# Patient Record
Sex: MALE | Race: OTHER | NOT HISPANIC OR LATINO | URBAN - METROPOLITAN AREA
[De-identification: names, ages, dates, MRNs, and addresses within clinical notes are randomized per-mention and may not be internally consistent; named-entity substitution may affect disease eponyms.]

---

## 2017-07-12 ENCOUNTER — EMERGENCY (EMERGENCY)
Facility: HOSPITAL | Age: 52
LOS: 1 days | Discharge: HOME | End: 2017-07-12
Admitting: EMERGENCY MEDICINE
Payer: MEDICARE

## 2017-07-12 VITALS
HEART RATE: 83 BPM | OXYGEN SATURATION: 97 % | SYSTOLIC BLOOD PRESSURE: 110 MMHG | DIASTOLIC BLOOD PRESSURE: 71 MMHG | RESPIRATION RATE: 16 BRPM | TEMPERATURE: 98 F

## 2017-07-12 DIAGNOSIS — F63.9 IMPULSE DISORDER, UNSPECIFIED: ICD-10-CM

## 2017-07-12 DIAGNOSIS — F79 UNSPECIFIED INTELLECTUAL DISABILITIES: ICD-10-CM

## 2017-07-12 DIAGNOSIS — F43.24 ADJUSTMENT DISORDER WITH DISTURBANCE OF CONDUCT: ICD-10-CM

## 2017-07-12 PROCEDURE — 90792 PSYCH DIAG EVAL W/MED SRVCS: CPT | Mod: GC

## 2017-07-12 PROCEDURE — 99284 EMERGENCY DEPT VISIT MOD MDM: CPT

## 2017-07-12 NOTE — ED BEHAVIORAL HEALTH ASSESSMENT NOTE - SUMMARY
Patient is a 52 y/o M, domiciled at the [facility], single, noncaregiver, disabled, with PPhx of depression, intellectual disability, and impulse control disorder, hx of remote inpt hospitalizations (last at [facility] in  after pt's father passed away), no hx of suicide attempts, no hx of violence or arrests, no hx of substance abuse. Patient presents brought in by EMS after becoming upset following recent breakup with a GF (peer) at the residence. Patient endorses "yelling and screaming" but denies episodes of physically aggressive behavior. He denies SI/HI/I/P. He reports chronic AH of his  father's voice in times of stress but denies this to be bothersome. He denies all other psychotic and mood symptoms. At this time pt is at low imminent risk of harm and does not require inpt hospitalization.

## 2017-07-12 NOTE — ED PROVIDER NOTE - OBJECTIVE STATEMENT
This is a 51 year old Male BIB from group home for [facility] eval for suicidal ideations. Patient reports he wanted to stab himself in the chest and endorses + AH. Patient reports he no longer wants to stab himself but the voices are bothering him. Denies SI at this time. Denies chest pain, SOB, N/V/D and fevers, Denies palpitations or diaphoresis. Denies Numbness, Tingling, Blurry Vision and HA.  Denies suicidal/homicidal thoughts. Denies visual/auditory hallucinations. Denies ETOH/Illicit drug use. Denies recent falls, trauma and injuries. Denies pain or any other medical complaints. This is a 51 year old Male PMHX Depression Intellectual disability  impulse control disorder BIB from group home for [facility] for suicidal ideations. Patient reports he wanted to stab himself in the chest and endorses + AH. Patient reports he no longer wants to stab himself but the voices are bothering him. Denies SI at this time. Denies chest pain, SOB, N/V/D and fevers, Denies palpitations or diaphoresis. Denies Numbness, Tingling, Blurry Vision and HA.  Denies suicidal/homicidal thoughts. Denies visual/auditory hallucinations. Denies ETOH/Illicit drug use. Denies recent falls, trauma and injuries. Denies pain or any other medical complaints.

## 2017-07-12 NOTE — ED BEHAVIORAL HEALTH NOTE - BEHAVIORAL HEALTH NOTE
Writer called Lisa Cabrera, [facility], [phone], the behavioral interventional specialist, at the patient’s residential facility, [facility], [phone], as well as Muna Lees, the housing supervisor, for collateral information. She reported the following:    Neither informant was aware of whether the patient has had IP psychiatric care in the past. He is in treatment with Dr. Siddiqi at [facility], [phone]. Today Ms. Cabrera was working with him and he expressed suicidal ideation, stating he was hearing CAH from the voice of his  father, so he was sent to the [facility] ED.     The patient is diagnosed with Depression, Impulse Control Disorder, and a mild intellectual disability. The patient had been doing well, eating, sleeping and tending to hygiene, with staff encouragement as usual, with no trouble.  He takes his medications as prescribed; there have been no recent medication changes. Today a female peer stated she did not like the fact that he hugged her. This upset him, and he came to Ms. Cabrera and stated he broke up with a girlfriend and was upset about it. He stated he was hearing the voice of his  father instructing him to stab himself in the chest to kill himself and come with him. He said his father’s voice said that was the only way to get rid of the pain. He has a remote history of self-injurious behavior, details unknown, but has not engaged in self-injurious behavior since the informants know him. He did not report any delusions. He has not been aggressive toward others or property, nor expressed thoughts of such behavior. He has no history of substance abuse.     The patient’s current medications are:   Seroquel 200 mg qam, 400 mg q5pm  Li Carbonate 600 mg bid  Mirtazipine 15mg hs  Ca 600 mg and Vit D bid  Multivitamin 1 qam  Vit D3 1000 IU qam  Vit C 250 mg qam  Fe Sulfate 325 mg qam  Lactose enzyme 3000 IU qd  Refresh lactulube to lower lip hs  Chotrimazole 1% soln 30 mg to affected area (feet) bid  Brimonidine 0.2% OP drop 5 mg, 1 drop each eye bid  Restasis .05% 0.4 mg X3 1 drop each eye bid  Cosopt pf soln 60’s each eye bid  Refresh Optive Advp/s U/D 1 drop each eye qid  Ammonium lactulose 12% cream 385 to feet hs  Latanoporst .005% op drop 1 drop each eye hs    Writer informed Ms. Lees that the patient was being discharged, and she stated a staff member would come and pick him up.

## 2017-07-12 NOTE — ED PROVIDER NOTE - PMH
<<----- Click to add NO pertinent Past Medical History No pertinent past medical history Depression    Impulse control disease    Intellectual disability

## 2017-07-12 NOTE — ED BEHAVIORAL HEALTH ASSESSMENT NOTE - CASE SUMMARY
50 y/o M, domiciled at the [facility], single, noncaregiver, disabled, with PPhx of depression, intellectual disability, and impulse control disorder, hx of remote inpt hospitalizations (last at [facility] in 1999 after pt's father passed away), no hx of suicide attempts, no hx of violence or arrests, no hx of substance abuse. Patient presents brought in by EMS after becoming upset following recent breakup with a GF (peer) at the residence.    Patient calm, pleasant, joking and smiling. Reports he was upset earlier over a break up but now feels better. Denies SI/HI/I/P as well as gerard and psychosis. No history of SIB or suicide attempts. Has been compliant with medications and at his baseline otherwise. Denies other psychiatric complaints. Does not meet criteria for inpatient admission and will be discharged home with above plan.

## 2017-07-12 NOTE — ED BEHAVIORAL HEALTH ASSESSMENT NOTE - OTHER PAST PSYCHIATRIC HISTORY (INCLUDE DETAILS REGARDING ONSET, COURSE OF ILLNESS, INPATIENT/OUTPATIENT TREATMENT)
PPhx of depression, intellectual disability, and impulse control disorder, hx of remote inpt hospitalizations (last at [facility] in 1999 after pt's father passed away), no hx of suicide attempts, no hx of violence or arrests, no hx of substance abuse.

## 2017-07-12 NOTE — ED PROVIDER NOTE - CARE PLAN
Principal Discharge DX:	Schizoaffective disorder Principal Discharge DX:	Intellectual disability  Secondary Diagnosis:	Impulse control disease

## 2017-07-12 NOTE — ED ADULT NURSE NOTE - OBJECTIVE STATEMENT
pt is a 51 year old male biba from group home c/o SI , told staff he wanted to  stab himself. pt seen in  intake area, pt states he does not want to hurt himself, does not want to hurt anyone else. pt is calm  and cooperative at this time.

## 2017-07-12 NOTE — ED BEHAVIORAL HEALTH ASSESSMENT NOTE - RISK ASSESSMENT
Risk factors include acute stressor (break up with GF) and impulsivity. Protective factors include future orientation, no hx of suicide attempts, future orientation, compliant with outpatient treatment and meds, able to engage in safety planning, and intact social and residential support. At this time pt is at low imminent risk of harm and does not require inpt hospitalization.

## 2017-07-12 NOTE — ED BEHAVIORAL HEALTH ASSESSMENT NOTE - SUICIDE PROTECTIVE FACTORS
Positive therapeutic relationships/Future oriented/Identifies reasons for living/Supportive social network or family

## 2017-07-12 NOTE — ED BEHAVIORAL HEALTH ASSESSMENT NOTE - CURRENT MEDICATION
seroquel 200mg qAM and 400mg QHS, Lithium 600mg BID, mirtazapine 15mg QHS, MVI, calcium supplements, vit D, iron, cholrimazole to feet BID, brimonidine and restasis 1 eyedrop BID, latanoprost to eyes QHS

## 2017-07-12 NOTE — ED BEHAVIORAL HEALTH ASSESSMENT NOTE - HPI (INCLUDE ILLNESS QUALITY, SEVERITY, DURATION, TIMING, CONTEXT, MODIFYING FACTORS, ASSOCIATED SIGNS AND SYMPTOMS)
Patient is a 50 y/o M, domiciled at the [facility], single, noncaregiver, disabled, with PPhx of depression, intellectual disability, and impulse control disorder, hx of remote inpt hospitalizations (last at [facility] in  after pt's father passed away), no hx of suicide attempts, no hx of violence or arrests, no hx of substance abuse. Patient presents brought in by EMS after becoming upset following recent breakup with a GF (peer) at the residence.    Patient is calm, cooperative, and pleasant on interview. He states "I'm fine, I'm calm now". He states "I blew up because I couldn't find a GF, but I found someone new I think". Patient states that he was "yelling and screaming" earlier today. He denies engaging in physically aggressive behaviors towards people or property. He reports good mood, sleep (8 hrs per night), good appetite, and adequate energy level. He denies SI/HI/I/P, urges for SIB, or aggressive I/I/P. He denies all manic symptoms including decreased need for sleep or elevated energy level. He denies paranoid ideation. He reports intermittent AH of his  father's voice that have been chronic since his death in . He states he heard the voices earlier today as the voices are more prominent during times of stress. He denies having CAH. He denies other perceptual disturbances. He denies substance use.    See  note for collateral information. Patient is a 50 y/o M, domiciled at the [facility], single, noncaregiver, disabled, with PPhx of depression, intellectual disability, and impulse control disorder, hx of remote inpt hospitalizations (last at [facility] in  after pt's father passed away), no hx of suicide attempts, no hx of violence or arrests, no hx of substance abuse. Patient presents brought in by EMS after becoming upset following recent breakup with a GF (peer) at the residence.    Patient is calm, cooperative, and pleasant on interview. He states "I'm fine, I'm calm now". He states "I blew up because I couldn't find a GF, but I found someone new I think". Patient states that he was "yelling and screaming" earlier today. He denies engaging in physically aggressive behaviors towards people or property. He reports good mood, sleep (8 hrs per night), good appetite, and adequate energy level. He denies SI/HI/I/P, urges for SIB, or aggressive I/I/P. He denies all manic symptoms including decreased need for sleep or elevated energy level. He denies paranoid ideation. He reports intermittent AH of his  father's voice that have been chronic since his death in . He states he heard the voices earlier today as the voices are more prominent during times of stress. He denies having CAH. He denies other perceptual disturbances. He denies substance use. He reports getting along well with peers and staff. He states that he enjoys activities including going to baseball games, dancing, and spending time with friends.    See  note for collateral information. Patient is a 50 y/o M, domiciled at the [facility] single, noncaregiver, disabled, with PPhx of depression, intellectual disability, and impulse control disorder, hx of remote inpt hospitalizations (last at [facility] in  after pt's father passed away), no hx of suicide attempts, no hx of violence or arrests, no hx of substance abuse. Patient presents brought in by EMS after becoming upset following recent breakup with a GF (peer) at the residence.    Patient is calm, cooperative, and pleasant on interview. He states "I'm fine, I'm calm now". He states "I blew up because I couldn't find a GF, but I found someone new I think". Patient states that he was "yelling and screaming" earlier today but does not recall what he was yelling. He denies engaging in physically aggressive behaviors towards people or property. He reports good mood, sleep (8 hrs per night), good appetite, and adequate energy level. He denies SI/HI/I/P, urges for SIB, or aggressive I/I/P. He denies all manic symptoms including decreased need for sleep or elevated energy level. He denies paranoid ideation. He reports intermittent AH of his  father's voice that have been chronic since his death in . He states he heard the voices earlier today as the voices are more prominent during times of stress. He denies having CAH. He denies other perceptual disturbances. He denies substance use. He reports getting along well with peers and staff. He states that he enjoys activities including going to baseball games, dancing, and spending time with friends.    See  note for collateral information.

## 2019-05-23 ENCOUNTER — TRANSCRIPTION ENCOUNTER (OUTPATIENT)
Age: 54
End: 2019-05-23

## 2022-12-01 PROBLEM — Z00.00 ENCOUNTER FOR PREVENTIVE HEALTH EXAMINATION: Status: ACTIVE | Noted: 2022-12-01

## 2022-12-20 PROBLEM — F63.9 IMPULSE DISORDER, UNSPECIFIED: Chronic | Status: ACTIVE | Noted: 2017-07-12

## 2022-12-20 PROBLEM — F32.9 MAJOR DEPRESSIVE DISORDER, SINGLE EPISODE, UNSPECIFIED: Chronic | Status: ACTIVE | Noted: 2017-07-12

## 2022-12-20 PROBLEM — F79 UNSPECIFIED INTELLECTUAL DISABILITIES: Chronic | Status: ACTIVE | Noted: 2017-07-12

## 2023-01-09 ENCOUNTER — APPOINTMENT (OUTPATIENT)
Dept: SURGERY | Facility: CLINIC | Age: 58
End: 2023-01-09
Payer: MEDICARE

## 2023-01-09 VITALS
DIASTOLIC BLOOD PRESSURE: 88 MMHG | TEMPERATURE: 98.5 F | HEART RATE: 80 BPM | HEIGHT: 67 IN | SYSTOLIC BLOOD PRESSURE: 134 MMHG

## 2023-01-09 PROCEDURE — 99203 OFFICE O/P NEW LOW 30 MIN: CPT

## 2023-02-02 ENCOUNTER — OUTPATIENT (OUTPATIENT)
Dept: OUTPATIENT SERVICES | Facility: HOSPITAL | Age: 58
LOS: 1 days | Discharge: HOME | End: 2023-02-02
Payer: MEDICARE

## 2023-02-02 VITALS
SYSTOLIC BLOOD PRESSURE: 118 MMHG | HEIGHT: 67 IN | OXYGEN SATURATION: 98 % | TEMPERATURE: 98 F | DIASTOLIC BLOOD PRESSURE: 83 MMHG | WEIGHT: 114.86 LBS | HEART RATE: 96 BPM | RESPIRATION RATE: 18 BRPM

## 2023-02-02 DIAGNOSIS — K40.90 UNILATERAL INGUINAL HERNIA, WITHOUT OBSTRUCTION OR GANGRENE, NOT SPECIFIED AS RECURRENT: ICD-10-CM

## 2023-02-02 DIAGNOSIS — F79 UNSPECIFIED INTELLECTUAL DISABILITIES: ICD-10-CM

## 2023-02-02 DIAGNOSIS — Z01.818 ENCOUNTER FOR OTHER PREPROCEDURAL EXAMINATION: ICD-10-CM

## 2023-02-02 DIAGNOSIS — F32.9 MAJOR DEPRESSIVE DISORDER, SINGLE EPISODE, UNSPECIFIED: ICD-10-CM

## 2023-02-02 DIAGNOSIS — F31.10 BIPOLAR DISORDER, CURRENT EPISODE MANIC WITHOUT PSYCHOTIC FEATURES, UNSPECIFIED: ICD-10-CM

## 2023-02-02 DIAGNOSIS — Z90.49 ACQUIRED ABSENCE OF OTHER SPECIFIED PARTS OF DIGESTIVE TRACT: Chronic | ICD-10-CM

## 2023-02-02 LAB
ANION GAP SERPL CALC-SCNC: 5 MMOL/L — SIGNIFICANT CHANGE UP (ref 5–17)
BLD GP AB SCN SERPL QL: SIGNIFICANT CHANGE UP
BUN SERPL-MCNC: 13 MG/DL — SIGNIFICANT CHANGE UP (ref 7–23)
CALCIUM SERPL-MCNC: 9.4 MG/DL — SIGNIFICANT CHANGE UP (ref 8.5–10.1)
CHLORIDE SERPL-SCNC: 111 MMOL/L — HIGH (ref 96–108)
CO2 SERPL-SCNC: 27 MMOL/L — SIGNIFICANT CHANGE UP (ref 22–31)
CREAT SERPL-MCNC: 1.12 MG/DL — SIGNIFICANT CHANGE UP (ref 0.5–1.3)
EGFR: 77 ML/MIN/1.73M2 — SIGNIFICANT CHANGE UP
GLUCOSE SERPL-MCNC: 105 MG/DL — HIGH (ref 70–99)
HCG SERPL-ACNC: <1 MIU/ML — SIGNIFICANT CHANGE UP
HCT VFR BLD CALC: 41.1 % — SIGNIFICANT CHANGE UP (ref 39–50)
HGB BLD-MCNC: 12.6 G/DL — LOW (ref 13–17)
MCHC RBC-ENTMCNC: 26.1 PG — LOW (ref 27–34)
MCHC RBC-ENTMCNC: 30.7 G/DL — LOW (ref 32–36)
MCV RBC AUTO: 85.3 FL — SIGNIFICANT CHANGE UP (ref 80–100)
NRBC # BLD: 0 /100 WBCS — SIGNIFICANT CHANGE UP (ref 0–0)
PLATELET # BLD AUTO: 293 K/UL — SIGNIFICANT CHANGE UP (ref 150–400)
POTASSIUM SERPL-MCNC: 4.5 MMOL/L — SIGNIFICANT CHANGE UP (ref 3.5–5.3)
POTASSIUM SERPL-SCNC: 4.5 MMOL/L — SIGNIFICANT CHANGE UP (ref 3.5–5.3)
RBC # BLD: 4.82 M/UL — SIGNIFICANT CHANGE UP (ref 4.2–5.8)
RBC # FLD: 14.8 % — HIGH (ref 10.3–14.5)
SODIUM SERPL-SCNC: 143 MMOL/L — SIGNIFICANT CHANGE UP (ref 135–145)
WBC # BLD: 5.29 K/UL — SIGNIFICANT CHANGE UP (ref 3.8–10.5)
WBC # FLD AUTO: 5.29 K/UL — SIGNIFICANT CHANGE UP (ref 3.8–10.5)

## 2023-02-02 PROCEDURE — 93010 ELECTROCARDIOGRAM REPORT: CPT

## 2023-02-02 NOTE — H&P PST ADULT - NSICDXPASTMEDICALHX_GEN_ALL_CORE_FT
PAST MEDICAL HISTORY:  Depression     Impulse control disease     Intellectual disability      PAST MEDICAL HISTORY:  Bipolar disorder, manic     Depression     Glaucoma     Impulse control disease     Intellectual disability     Legally blind

## 2023-02-02 NOTE — H&P PST ADULT - PROBLEM SELECTOR PLAN 1
Labs-CBC, BMP ,T&S EKG   M/C required    Preop Hibiclens x 1 day instructions reviewed and given.   Take routine meds DOS with small sips of water, avoid NSAIDs and OTC supplements  Pt aware COVID-19 PCR test needed 3-5 days prior to surgery   Anesthesiologist to review PST labs, EKG, required clearances, and optimization for surgery

## 2023-02-02 NOTE — H&P PST ADULT - HISTORY OF PRESENT ILLNESS
56 y/o M PMH  56 y/o M PMH glaucoma, mild intellectual disability presents to PST for left inguinal hernia repair open on 2/14/23 with     This patient denies any fever, cough, sob, flu like symptoms or travel outside of the [place] in the past 30 days      58 y/o M PMH glaucoma, mild intellectual disability, bipolar on lithium presents to PST for left inguinal hernia repair open on 2/14/23 with     This patient denies any fever, cough, sob, flu like symptoms or travel outside of the [place] in the past 30 days

## 2023-02-02 NOTE — H&P PST ADULT - ASSESSMENT
56 y/o M PMH glaucoma, mild intellectual disability presents to PST for left inguinal hernia repair open on 23 with Dr.Held CAPRINI SCORE [CLOT]    AGE RELATED RISK FACTORS                                                       MOBILITY RELATED FACTORS  [x ] Age 41-60 years                                            (1 Point)                  [ ] Bed rest                                                        (1 Point)  [ ] Age: 61-74 years                                           (2 Points)                 [ ] Plaster cast                                                   (2 Points)  [ ] Age= 75 years                                              (3 Points)                 [ ] Bed bound for more than 72 hours                 (2 Points)    DISEASE RELATED RISK FACTORS                                               GENDER SPECIFIC FACTORS  [ ] Edema in the lower extremities                       (1 Point)                  [ ] Pregnancy                                                     (1 Point)  [ ] Varicose veins                                               (1 Point)                  [ ] Post-partum < 6 weeks                                   (1 Point)             [ ] BMI > 25 Kg/m2                                            (1 Point)                  [ ] Hormonal therapy  or oral contraception          (1 Point)                 [ ] Sepsis (in the previous month)                        (1 Point)                  [ ] History of pregnancy complications                 (1 point)  [ ] Pneumonia or serious lung disease                                               [ ] Unexplained or recurrent                     (1 Point)           (in the previous month)                               (1 Point)  [ ] Abnormal pulmonary function test                     (1 Point)                 SURGERY RELATED RISK FACTORS  [ ] Acute myocardial infarction                              (1 Point)                 [ ]  Section                                             (1 Point)  [ ] Congestive heart failure (in the previous month)  (1 Point)               [ ] Minor surgery                                                  (1 Point)   [ ] Inflammatory bowel disease                             (1 Point)                 [ ] Arthroscopic surgery                                        (2 Points)  [ ] Central venous access                                      (2 Points)                [x ] General surgery lasting more than 45 minutes   (2 Points)       [ ] Stroke (in the previous month)                          (5 Points)               [ ] Elective arthroplasty                                         (5 Points)                                                                                                                                               HEMATOLOGY RELATED FACTORS                                                 TRAUMA RELATED RISK FACTORS  [ ] Prior episodes of VTE                                     (3 Points)                [ ] Fracture of the hip, pelvis, or leg                       (5 Points)  [ ] Positive family history for VTE                         (3 Points)                 [ ] Acute spinal cord injury (in the previous month)  (5 Points)  [ ] Prothrombin 08017 A                                     (3 Points)                 [ ] Paralysis  (less than 1 month)                             (5 Points)  [ ] Factor V Leiden                                             (3 Points)                  [ ] Multiple Trauma within 1 month                        (5 Points)  [ ] Lupus anticoagulants                                     (3 Points)                                                           [ ] Anticardiolipin antibodies                               (3 Points)                                                       [ ] High homocysteine in the blood                      (3 Points)                                             [ ] Other congenital or acquired thrombophilia      (3 Points)                                                [ ] Heparin induced thrombocytopenia                  (3 Points)                                          Total Score [    3  ]    Matthewrini Score 0 - 2:  Low Risk, No VTE Prophylaxis required for most patients, encourage ambulation  Caprini Score 3 - 6:  At Risk, pharmacologic VTE prophylaxis is indicated for most patients (in the absence of a contraindication)  Caprini Score Greater than or = 7:  High Risk, pharmacologic VTE prophylaxis is indicated for most patients (in the absence of a contraindication)

## 2023-02-13 ENCOUNTER — TRANSCRIPTION ENCOUNTER (OUTPATIENT)
Age: 58
End: 2023-02-13

## 2023-02-14 ENCOUNTER — APPOINTMENT (OUTPATIENT)
Dept: SURGERY | Facility: HOSPITAL | Age: 58
End: 2023-02-14

## 2023-02-14 ENCOUNTER — TRANSCRIPTION ENCOUNTER (OUTPATIENT)
Age: 58
End: 2023-02-14

## 2023-02-14 ENCOUNTER — OUTPATIENT (OUTPATIENT)
Dept: OUTPATIENT SERVICES | Facility: HOSPITAL | Age: 58
LOS: 1 days | Discharge: HOME | End: 2023-02-14
Payer: COMMERCIAL

## 2023-02-14 VITALS
HEART RATE: 103 BPM | OXYGEN SATURATION: 99 % | SYSTOLIC BLOOD PRESSURE: 138 MMHG | RESPIRATION RATE: 18 BRPM | WEIGHT: 114.86 LBS | TEMPERATURE: 98 F | DIASTOLIC BLOOD PRESSURE: 97 MMHG | HEIGHT: 67 IN

## 2023-02-14 VITALS
RESPIRATION RATE: 17 BRPM | OXYGEN SATURATION: 100 % | DIASTOLIC BLOOD PRESSURE: 94 MMHG | HEART RATE: 87 BPM | SYSTOLIC BLOOD PRESSURE: 137 MMHG

## 2023-02-14 DIAGNOSIS — Z90.49 ACQUIRED ABSENCE OF OTHER SPECIFIED PARTS OF DIGESTIVE TRACT: Chronic | ICD-10-CM

## 2023-02-14 PROCEDURE — 49505 PRP I/HERN INIT REDUC >5 YR: CPT

## 2023-02-14 PROCEDURE — 49505 PRP I/HERN INIT REDUC >5 YR: CPT | Mod: AS

## 2023-02-14 DEVICE — MESH HERNIA PARIETEX PROGRIP 14 X 9CM LEFT: Type: IMPLANTABLE DEVICE | Site: LEFT | Status: FUNCTIONAL

## 2023-02-14 RX ORDER — FENTANYL CITRATE 50 UG/ML
25 INJECTION INTRAVENOUS
Refills: 0 | Status: DISCONTINUED | OUTPATIENT
Start: 2023-02-14 | End: 2023-02-15

## 2023-02-14 RX ORDER — SODIUM CHLORIDE 9 MG/ML
3 INJECTION INTRAMUSCULAR; INTRAVENOUS; SUBCUTANEOUS EVERY 8 HOURS
Refills: 0 | Status: DISCONTINUED | OUTPATIENT
Start: 2023-02-14 | End: 2023-02-14

## 2023-02-14 RX ORDER — SODIUM CHLORIDE 9 MG/ML
1000 INJECTION, SOLUTION INTRAVENOUS
Refills: 0 | Status: DISCONTINUED | OUTPATIENT
Start: 2023-02-14 | End: 2023-02-15

## 2023-02-14 RX ORDER — FENTANYL CITRATE 50 UG/ML
50 INJECTION INTRAVENOUS
Refills: 0 | Status: DISCONTINUED | OUTPATIENT
Start: 2023-02-14 | End: 2023-02-15

## 2023-02-14 RX ORDER — OXYCODONE HYDROCHLORIDE 5 MG/1
1 TABLET ORAL
Qty: 10 | Refills: 0
Start: 2023-02-14

## 2023-02-14 RX ORDER — ONDANSETRON 8 MG/1
4 TABLET, FILM COATED ORAL ONCE
Refills: 0 | Status: DISCONTINUED | OUTPATIENT
Start: 2023-02-14 | End: 2023-02-15

## 2023-02-14 RX ADMIN — SODIUM CHLORIDE 75 MILLILITER(S): 9 INJECTION, SOLUTION INTRAVENOUS at 10:20

## 2023-02-14 RX ADMIN — FENTANYL CITRATE 25 MICROGRAM(S): 50 INJECTION INTRAVENOUS at 11:24

## 2023-02-14 RX ADMIN — FENTANYL CITRATE 25 MICROGRAM(S): 50 INJECTION INTRAVENOUS at 10:54

## 2023-02-14 RX ADMIN — SODIUM CHLORIDE 3 MILLILITER(S): 9 INJECTION INTRAMUSCULAR; INTRAVENOUS; SUBCUTANEOUS at 07:56

## 2023-02-14 NOTE — ASU DISCHARGE PLAN (ADULT/PEDIATRIC) - NURSING INSTRUCTIONS
discharged instructions and teachings done. Patient and sister verbalizes understanding of teachings. Sister will f/u with md as plan.

## 2023-02-14 NOTE — ASU DISCHARGE PLAN (ADULT/PEDIATRIC) - NS MD DC FALL RISK RISK
For information on Fall & Injury Prevention, visit: [email or web address] OR  [email or web address] OR  https://www.cdc.gov/steadi/patient.html

## 2023-02-14 NOTE — ASU DISCHARGE PLAN (ADULT/PEDIATRIC) - ASU DC SPECIAL INSTRUCTIONSFT
ice packs on-off for 24hrs    f/u 1 week with Dr. Gray      take 1000mg tylenol + 400,g motrin or advil every 6hrs as needed for pain. may add 1 oxycodone if needed for severe pain

## 2023-02-14 NOTE — ASU PATIENT PROFILE, ADULT - NSICDXPASTMEDICALHX_GEN_ALL_CORE_FT
PAST MEDICAL HISTORY:  Bipolar disorder, manic     Depression     Glaucoma     Impulse control disease     Intellectual disability     Legally blind

## 2023-02-16 DIAGNOSIS — Z79.899 OTHER LONG TERM (CURRENT) DRUG THERAPY: ICD-10-CM

## 2023-02-16 DIAGNOSIS — K40.90 UNILATERAL INGUINAL HERNIA, WITHOUT OBSTRUCTION OR GANGRENE, NOT SPECIFIED AS RECURRENT: ICD-10-CM

## 2023-09-20 PROBLEM — H54.8 LEGAL BLINDNESS, AS DEFINED IN USA: Chronic | Status: ACTIVE | Noted: 2023-02-02

## 2023-09-20 PROBLEM — H40.9 UNSPECIFIED GLAUCOMA: Chronic | Status: ACTIVE | Noted: 2023-02-02

## 2023-09-20 PROBLEM — F31.10 BIPOLAR DISORDER, CURRENT EPISODE MANIC WITHOUT PSYCHOTIC FEATURES, UNSPECIFIED: Chronic | Status: ACTIVE | Noted: 2023-02-02

## 2023-09-22 ENCOUNTER — OUTPATIENT (OUTPATIENT)
Dept: OUTPATIENT SERVICES | Facility: HOSPITAL | Age: 58
LOS: 1 days | End: 2023-09-22
Payer: MEDICARE

## 2023-09-22 VITALS
WEIGHT: 111.99 LBS | HEIGHT: 66 IN | TEMPERATURE: 98 F | SYSTOLIC BLOOD PRESSURE: 120 MMHG | DIASTOLIC BLOOD PRESSURE: 76 MMHG | OXYGEN SATURATION: 97 % | RESPIRATION RATE: 18 BRPM | HEART RATE: 76 BPM

## 2023-09-22 DIAGNOSIS — Z98.890 OTHER SPECIFIED POSTPROCEDURAL STATES: Chronic | ICD-10-CM

## 2023-09-22 DIAGNOSIS — Z90.49 ACQUIRED ABSENCE OF OTHER SPECIFIED PARTS OF DIGESTIVE TRACT: Chronic | ICD-10-CM

## 2023-09-22 DIAGNOSIS — K05.6 PERIODONTAL DISEASE, UNSPECIFIED: ICD-10-CM

## 2023-09-22 DIAGNOSIS — K02.62 DENTAL CARIES ON SMOOTH SURFACE PENETRATING INTO DENTIN: ICD-10-CM

## 2023-09-22 DIAGNOSIS — Z01.818 ENCOUNTER FOR OTHER PREPROCEDURAL EXAMINATION: ICD-10-CM

## 2023-09-22 LAB
ANION GAP SERPL CALC-SCNC: 10 MMOL/L — SIGNIFICANT CHANGE UP (ref 5–17)
BUN SERPL-MCNC: 20 MG/DL — SIGNIFICANT CHANGE UP (ref 7–23)
CALCIUM SERPL-MCNC: 10.4 MG/DL — SIGNIFICANT CHANGE UP (ref 8.4–10.5)
CHLORIDE SERPL-SCNC: 105 MMOL/L — SIGNIFICANT CHANGE UP (ref 96–108)
CO2 SERPL-SCNC: 27 MMOL/L — SIGNIFICANT CHANGE UP (ref 22–31)
CREAT SERPL-MCNC: 1.15 MG/DL — SIGNIFICANT CHANGE UP (ref 0.5–1.3)
EGFR: 75 ML/MIN/1.73M2 — SIGNIFICANT CHANGE UP
GLUCOSE SERPL-MCNC: 108 MG/DL — HIGH (ref 70–99)
HCT VFR BLD CALC: 40.2 % — SIGNIFICANT CHANGE UP (ref 39–50)
HGB BLD-MCNC: 12.2 G/DL — LOW (ref 13–17)
MCHC RBC-ENTMCNC: 26.8 PG — LOW (ref 27–34)
MCHC RBC-ENTMCNC: 30.3 GM/DL — LOW (ref 32–36)
MCV RBC AUTO: 88.4 FL — SIGNIFICANT CHANGE UP (ref 80–100)
NRBC # BLD: 0 /100 WBCS — SIGNIFICANT CHANGE UP (ref 0–0)
PLATELET # BLD AUTO: 295 K/UL — SIGNIFICANT CHANGE UP (ref 150–400)
POTASSIUM SERPL-MCNC: 4.8 MMOL/L — SIGNIFICANT CHANGE UP (ref 3.5–5.3)
POTASSIUM SERPL-SCNC: 4.8 MMOL/L — SIGNIFICANT CHANGE UP (ref 3.5–5.3)
RBC # BLD: 4.55 M/UL — SIGNIFICANT CHANGE UP (ref 4.2–5.8)
RBC # FLD: 14.9 % — HIGH (ref 10.3–14.5)
SODIUM SERPL-SCNC: 142 MMOL/L — SIGNIFICANT CHANGE UP (ref 135–145)
WBC # BLD: 7.12 K/UL — SIGNIFICANT CHANGE UP (ref 3.8–10.5)
WBC # FLD AUTO: 7.12 K/UL — SIGNIFICANT CHANGE UP (ref 3.8–10.5)

## 2023-09-22 PROCEDURE — 36415 COLL VENOUS BLD VENIPUNCTURE: CPT

## 2023-09-22 PROCEDURE — G0463: CPT

## 2023-09-22 PROCEDURE — 85027 COMPLETE CBC AUTOMATED: CPT

## 2023-09-22 PROCEDURE — 80048 BASIC METABOLIC PNL TOTAL CA: CPT

## 2023-09-22 RX ORDER — POLYETHYLENE GLYCOL 3350 17 G/17G
0 POWDER, FOR SOLUTION ORAL
Qty: 0 | Refills: 0 | DISCHARGE

## 2023-09-22 RX ORDER — QUETIAPINE FUMARATE 200 MG/1
1 TABLET, FILM COATED ORAL
Qty: 0 | Refills: 0 | DISCHARGE

## 2023-09-22 RX ORDER — SODIUM CHLORIDE 9 MG/ML
3 INJECTION INTRAMUSCULAR; INTRAVENOUS; SUBCUTANEOUS EVERY 8 HOURS
Refills: 0 | Status: DISCONTINUED | OUTPATIENT
Start: 2023-10-13 | End: 2023-10-27

## 2023-09-22 NOTE — H&P PST ADULT - HISTORY OF PRESENT ILLNESS
58 y/o M PMH glaucoma, mild intellectual disability, bipolar on lithium s/p  left inguinal hernia repair on 2/14/23 . Presents to [facility] for scheduled Comprehensive Dental Treatment Under Anesthesia on 10/13/23.

## 2023-09-22 NOTE — H&P PST ADULT - PROBLEM SELECTOR PLAN 1
Comprehensive  Dental Treatment  Under Anesthesia on 10/13/23.  Labs-CBC, BMP   M/C required    Take routine meds DOS with small sips of water

## 2023-09-22 NOTE — H&P PST ADULT - ASSESSMENT
ACTIVITY-  lives in group home , walk around unit, participate activities   Energy Expenditure(Mets):    6.25 by dasi mets   Symptoms-none     Airway:  normal    Mallampati-  2   Dental: Patient denies loose teeth missing teeth

## 2023-09-22 NOTE — H&P PST ADULT - NSANTHGENDERRD_ENT_A_CORE
From: Crescencio Garcia  To: Lucinda Alcaraz MD  Sent: 5/15/2019 12:29 PM CDT  Subject: Other    With regards to my mother who is also your patient. Zeenat Garcia.  Pertaining to her latest PET scan results, would it be possible to call her and clarify the results of the test with her prior to her appt. with the Pulmonologist. The original appt. could only be scheduled for May 30th with a Dr. Keagan Malin DO, which I found unsettling. So I called on her behalf and was able to get the appt. changed to an earlier date of May 22 with a Dr. Zhen Salgado MD.    I am asking this on her behalf due to the past history with my Father and his issues a couple years ago now. I know this will be eating at her until we know more. Some clarification on what the PET scan would at least clear a little fog until she can see Dr. Fontaine. I know she is concerned about cancer.    If possible, a call sometime this evening or tomorrow after 4:15pm would be preferred, so I can be there as well to clear any misconception she may have.    Her Home number which you have on file is [phone].    I hope this is possible.   If not this evening or tomorrow, if you could let me know when, that would be helpful.    Thanks!    Crescencio   Yes

## 2023-10-12 ENCOUNTER — TRANSCRIPTION ENCOUNTER (OUTPATIENT)
Age: 58
End: 2023-10-12

## 2023-10-13 ENCOUNTER — TRANSCRIPTION ENCOUNTER (OUTPATIENT)
Age: 58
End: 2023-10-13

## 2023-10-13 ENCOUNTER — OUTPATIENT (OUTPATIENT)
Dept: OUTPATIENT SERVICES | Facility: HOSPITAL | Age: 58
LOS: 1 days | End: 2023-10-13
Payer: MEDICARE

## 2023-10-13 VITALS
SYSTOLIC BLOOD PRESSURE: 175 MMHG | RESPIRATION RATE: 18 BRPM | HEART RATE: 83 BPM | OXYGEN SATURATION: 94 % | DIASTOLIC BLOOD PRESSURE: 94 MMHG

## 2023-10-13 VITALS
HEART RATE: 88 BPM | DIASTOLIC BLOOD PRESSURE: 89 MMHG | OXYGEN SATURATION: 99 % | WEIGHT: 111.99 LBS | SYSTOLIC BLOOD PRESSURE: 146 MMHG | RESPIRATION RATE: 15 BRPM | HEIGHT: 66 IN | TEMPERATURE: 97 F

## 2023-10-13 DIAGNOSIS — Z98.890 OTHER SPECIFIED POSTPROCEDURAL STATES: Chronic | ICD-10-CM

## 2023-10-13 DIAGNOSIS — Z01.818 ENCOUNTER FOR OTHER PREPROCEDURAL EXAMINATION: ICD-10-CM

## 2023-10-13 DIAGNOSIS — Z90.49 ACQUIRED ABSENCE OF OTHER SPECIFIED PARTS OF DIGESTIVE TRACT: Chronic | ICD-10-CM

## 2023-10-13 DIAGNOSIS — K05.6 PERIODONTAL DISEASE, UNSPECIFIED: ICD-10-CM

## 2023-10-13 DIAGNOSIS — K02.62 DENTAL CARIES ON SMOOTH SURFACE PENETRATING INTO DENTIN: ICD-10-CM

## 2023-10-13 PROCEDURE — D4210: CPT

## 2023-10-13 PROCEDURE — D7210: CPT

## 2023-10-13 PROCEDURE — C1889: CPT

## 2023-10-13 PROCEDURE — D4211: CPT

## 2023-10-13 PROCEDURE — D4341: CPT

## 2023-10-13 DEVICE — SURGICEL 4 X 8": Type: IMPLANTABLE DEVICE | Status: FUNCTIONAL

## 2023-10-13 RX ORDER — ACETAMINOPHEN 500 MG
750 TABLET ORAL ONCE
Refills: 0 | Status: DISCONTINUED | OUTPATIENT
Start: 2023-10-13 | End: 2023-10-27

## 2023-10-13 RX ORDER — LIDOCAINE HCL 20 MG/ML
0.2 VIAL (ML) INJECTION ONCE
Refills: 0 | Status: COMPLETED | OUTPATIENT
Start: 2023-10-13 | End: 2023-10-13

## 2023-10-13 NOTE — ASU PATIENT PROFILE, ADULT - PRO ARRIVE FROM
[facility]   /[facility]   [phone] with Colleen/yordy

## 2023-10-13 NOTE — ASU DISCHARGE PLAN (ADULT/PEDIATRIC) - ASU DC SPECIAL INSTRUCTIONSFT
comprehensive dental treatment under general anesthesia     TYLENOL FOR THE NEXT TWO DAYS FOR COMFORT    ANTIBIOTICS WILL BE PRESCRIBED     NO STRAW FOR TWO DAYS, KEEP HEAD ELEVATED FOR THE NEXT TWO DAYS AND NIGHTS    USE A PEA SIZED AMOUNT OF TOOTHPASTE TO BRUSH TEETH, NO RINSING OR SPITTING FOR TWO DAYS     TWO EXTRACTIONS WERE PERFORMED TO THE LOWER LEFT SIDE, ONE TO THE UPPER RIGHT AND ONE TO THE UPPER LEFT SIDE ALL POSTERIOR AND ONE UPPER ANTERIOR DUE TO MOBILITY  5 TOTAL     SEE SEE DR PHAN ON TUESDAY AT [facility] AT 1:15 PM APPT IS SET comprehensive dental treatment under general anesthesia     TYLENOL FOR THE NEXT TWO DAYS FOR COMFORT    ANTIBIOTICS WILL BE PRESCRIBED     NO STRAW FOR TWO DAYS, KEEP HEAD ELEVATED FOR THE NEXT TWO DAYS AND NIGHTS    USE A PEA SIZED AMOUNT OF TOOTHPASTE TO BRUSH TEETH, NO RINSING OR SPITTING FOR TWO DAYS     TWO EXTRACTIONS WERE PERFORMED TO THE LOWER LEFT SIDE, ONE TO THE UPPER RIGHT AND ONE TO THE UPPER LEFT SIDE ALL POSTERIOR AND ONE UPPER ANTERIOR DUE TO MOBILITY  5 TOTAL     [facility] DR PHAN [facility] AT [facility] PM APPT [facility] SET    MAY  RETURN TO PROGRAM ON MONDAY.

## 2023-10-13 NOTE — ASU DISCHARGE PLAN (ADULT/PEDIATRIC) - NS MD DC FALL RISK RISK
For information on Fall & Injury Prevention, visit: [email or web address] OR  [email or web address] OR  https://www.cdc.gov/steadi/patient.html

## 2023-10-13 NOTE — ASU PREOP CHECKLIST - 1.
pt answering questions and alert and O x4 pt answering questions and alert and O x4 and info gotten from group home coordinator Colleen delacruz

## 2023-10-13 NOTE — ASU DISCHARGE PLAN (ADULT/PEDIATRIC) - NURSING INSTRUCTIONS
Tylenol/acetaminophen as needed for pain/discomfort.  Ice to jaw, if possible, intermittantly for the next two days.  Follow up with MD as requested; call office for appointment.

## 2023-10-17 ENCOUNTER — TRANSCRIPTION ENCOUNTER (OUTPATIENT)
Age: 58
End: 2023-10-17

## 2023-10-18 ENCOUNTER — TRANSCRIPTION ENCOUNTER (OUTPATIENT)
Age: 58
End: 2023-10-18

## 2023-10-18 ENCOUNTER — OUTPATIENT (OUTPATIENT)
Dept: OUTPATIENT SERVICES | Facility: HOSPITAL | Age: 58
LOS: 1 days | End: 2023-10-18
Payer: MEDICARE

## 2023-10-18 VITALS
SYSTOLIC BLOOD PRESSURE: 168 MMHG | OXYGEN SATURATION: 97 % | HEART RATE: 80 BPM | DIASTOLIC BLOOD PRESSURE: 90 MMHG | RESPIRATION RATE: 18 BRPM

## 2023-10-18 VITALS
HEIGHT: 66 IN | RESPIRATION RATE: 15 BRPM | TEMPERATURE: 98 F | WEIGHT: 111.99 LBS | SYSTOLIC BLOOD PRESSURE: 141 MMHG | OXYGEN SATURATION: 98 % | HEART RATE: 85 BPM | DIASTOLIC BLOOD PRESSURE: 90 MMHG

## 2023-10-18 DIAGNOSIS — Z98.890 OTHER SPECIFIED POSTPROCEDURAL STATES: Chronic | ICD-10-CM

## 2023-10-18 DIAGNOSIS — K02.62 DENTAL CARIES ON SMOOTH SURFACE PENETRATING INTO DENTIN: ICD-10-CM

## 2023-10-18 DIAGNOSIS — K05.6 PERIODONTAL DISEASE, UNSPECIFIED: ICD-10-CM

## 2023-10-18 DIAGNOSIS — Z90.49 ACQUIRED ABSENCE OF OTHER SPECIFIED PARTS OF DIGESTIVE TRACT: Chronic | ICD-10-CM

## 2023-10-18 PROCEDURE — D2391: CPT

## 2023-10-18 PROCEDURE — D7210: CPT

## 2023-10-18 PROCEDURE — D2332: CPT

## 2023-10-18 PROCEDURE — C1889: CPT

## 2023-10-18 PROCEDURE — C9399: CPT

## 2023-10-18 PROCEDURE — D2393: CPT

## 2023-10-18 DEVICE — SURGICEL 2 X 14": Type: IMPLANTABLE DEVICE | Status: FUNCTIONAL

## 2023-10-18 RX ORDER — SUCRALFATE 1 G
10 TABLET ORAL
Qty: 0 | Refills: 0 | DISCHARGE

## 2023-10-18 RX ORDER — ONDANSETRON 8 MG/1
4 TABLET, FILM COATED ORAL ONCE
Refills: 0 | Status: DISCONTINUED | OUTPATIENT
Start: 2023-10-18 | End: 2023-11-02

## 2023-10-18 RX ORDER — CHOLECALCIFEROL (VITAMIN D3) 125 MCG
1 CAPSULE ORAL
Qty: 0 | Refills: 0 | DISCHARGE

## 2023-10-18 RX ORDER — BRIMONIDINE TARTRATE 2 MG/MG
1 SOLUTION/ DROPS OPHTHALMIC
Qty: 0 | Refills: 0 | DISCHARGE

## 2023-10-18 RX ORDER — MIRTAZAPINE 45 MG/1
1 TABLET, ORALLY DISINTEGRATING ORAL
Qty: 0 | Refills: 0 | DISCHARGE

## 2023-10-18 RX ORDER — QUETIAPINE FUMARATE 200 MG/1
1 TABLET, FILM COATED ORAL
Refills: 0 | DISCHARGE

## 2023-10-18 RX ORDER — LITHIUM CARBONATE 300 MG/1
300 TABLET, EXTENDED RELEASE ORAL
Qty: 0 | Refills: 0 | DISCHARGE

## 2023-10-18 RX ORDER — OXYCODONE HYDROCHLORIDE 5 MG/1
5 TABLET ORAL ONCE
Refills: 0 | Status: DISCONTINUED | OUTPATIENT
Start: 2023-10-18 | End: 2023-10-18

## 2023-10-18 RX ORDER — LATANOPROST 0.05 MG/ML
1 SOLUTION/ DROPS OPHTHALMIC; TOPICAL
Qty: 0 | Refills: 0 | DISCHARGE

## 2023-10-18 RX ORDER — LACTULOSE 10 G/15ML
15 SOLUTION ORAL
Qty: 0 | Refills: 0 | DISCHARGE

## 2023-10-18 RX ORDER — PANTOPRAZOLE SODIUM 20 MG/1
1 TABLET, DELAYED RELEASE ORAL
Qty: 0 | Refills: 0 | DISCHARGE

## 2023-10-18 RX ORDER — ASCORBIC ACID 60 MG
1 TABLET,CHEWABLE ORAL
Qty: 0 | Refills: 0 | DISCHARGE

## 2023-10-18 RX ORDER — DORZOLAMIDE HYDROCHLORIDE TIMOLOL MALEATE 20; 5 MG/ML; MG/ML
1 SOLUTION/ DROPS OPHTHALMIC
Qty: 0 | Refills: 0 | DISCHARGE

## 2023-10-18 NOTE — ASU PATIENT PROFILE, ADULT - FALL HARM RISK - UNIVERSAL INTERVENTIONS
Bed in lowest position, wheels locked, appropriate side rails in place/Call bell, personal items and telephone in reach/Instruct patient to call for assistance before getting out of bed or chair/Non-slip footwear when patient is out of bed/[place] to call system/Physically safe environment - no spills, clutter or unnecessary equipment/Purposeful Proactive Rounding/Room/bathroom lighting operational, light cord in reach

## 2023-10-18 NOTE — ASU DISCHARGE PLAN (ADULT/PEDIATRIC) - NS MD DC FALL RISK RISK
For information on Fall & Injury Prevention, visit: [email or web address] OR  [email or web address] OR  https://www.cdc.gov/steadi/patient.html

## 2023-10-18 NOTE — ASU DISCHARGE PLAN (ADULT/PEDIATRIC) - NURSING INSTRUCTIONS
Next dose of OK to take Tylenol/Acetaminophen at / after 9:30PM______ for pain and every 6 hours after as needed. OK to take Motrin/Ibuprofen at / after 10PM_____ for pain and every 6 hours after as needed. Take alternate pain medicines.

## 2023-10-18 NOTE — ASU DISCHARGE PLAN (ADULT/PEDIATRIC) - ASU DC SPECIAL INSTRUCTIONSFT
tylenol prn pain and take tylenol for two days     comprehensive dental treatment under general anesthesia    4 extractions performed due to mobility    [place] Ngo [place] 10[place] [facility] [place]     restorations and periodontal treatment performed as well

## 2024-01-02 NOTE — H&P PST ADULT - GENITOURINARY
famotidine (PEPCID) 40 MG tablet 90 tablet 3 1/23/2023 --    Sig - Route: Take 1 tablet by mouth nightly.      rosuvastatin (CRESTOR) 40 MG tablet 90 tablet 3 1/23/2023 --    Sig - Route: Take 1 tablet by mouth daily.      montelukast (SINGULAIR) 10 MG tablet 90 tablet 3 1/23/2023 --    Sig - Route: Take 1 tablet by mouth nightly.      Last office visit date: 01/23/23  Next appointment scheduled?: Yes   Number of refills given: 0  
details…

## 2024-11-21 ENCOUNTER — APPOINTMENT (OUTPATIENT)
Dept: PULMONOLOGY | Facility: CLINIC | Age: 59
End: 2024-11-21
Payer: COMMERCIAL

## 2024-11-21 VITALS
DIASTOLIC BLOOD PRESSURE: 92 MMHG | BODY MASS INDEX: 16.79 KG/M2 | HEART RATE: 83 BPM | OXYGEN SATURATION: 94 % | SYSTOLIC BLOOD PRESSURE: 136 MMHG | HEIGHT: 67 IN | WEIGHT: 107 LBS

## 2024-11-21 DIAGNOSIS — R91.1 SOLITARY PULMONARY NODULE: ICD-10-CM

## 2024-11-21 PROCEDURE — 99205 OFFICE O/P NEW HI 60 MIN: CPT

## 2024-11-23 PROBLEM — R91.1 LUNG NODULE, SOLITARY: Status: ACTIVE | Noted: 2024-11-23

## 2025-01-28 ENCOUNTER — APPOINTMENT (OUTPATIENT)
Dept: CT IMAGING | Facility: CLINIC | Age: 60
End: 2025-01-28

## 2025-01-28 ENCOUNTER — OUTPATIENT (OUTPATIENT)
Dept: OUTPATIENT SERVICES | Facility: HOSPITAL | Age: 60
LOS: 1 days | End: 2025-01-28
Payer: COMMERCIAL

## 2025-01-28 DIAGNOSIS — Z98.890 OTHER SPECIFIED POSTPROCEDURAL STATES: Chronic | ICD-10-CM

## 2025-01-28 DIAGNOSIS — R91.1 SOLITARY PULMONARY NODULE: ICD-10-CM

## 2025-01-28 DIAGNOSIS — Z90.49 ACQUIRED ABSENCE OF OTHER SPECIFIED PARTS OF DIGESTIVE TRACT: Chronic | ICD-10-CM

## 2025-01-28 PROCEDURE — 71250 CT THORAX DX C-: CPT | Mod: 26

## 2025-01-28 PROCEDURE — 71250 CT THORAX DX C-: CPT

## 2025-01-30 ENCOUNTER — TRANSCRIPTION ENCOUNTER (OUTPATIENT)
Age: 60
End: 2025-01-30

## 2025-03-06 ENCOUNTER — APPOINTMENT (OUTPATIENT)
Dept: PULMONOLOGY | Facility: CLINIC | Age: 60
End: 2025-03-06
Payer: COMMERCIAL

## 2025-03-06 ENCOUNTER — APPOINTMENT (OUTPATIENT)
Dept: PULMONOLOGY | Facility: CLINIC | Age: 60
End: 2025-03-06

## 2025-03-06 VITALS
TEMPERATURE: 97.8 F | OXYGEN SATURATION: 97 % | BODY MASS INDEX: 17.94 KG/M2 | DIASTOLIC BLOOD PRESSURE: 82 MMHG | WEIGHT: 109 LBS | HEIGHT: 65.5 IN | RESPIRATION RATE: 16 BRPM | SYSTOLIC BLOOD PRESSURE: 127 MMHG | HEART RATE: 94 BPM

## 2025-03-06 DIAGNOSIS — R91.1 SOLITARY PULMONARY NODULE: ICD-10-CM

## 2025-03-06 PROCEDURE — 99213 OFFICE O/P EST LOW 20 MIN: CPT

## 2025-03-06 PROCEDURE — 99214 OFFICE O/P EST MOD 30 MIN: CPT

## 2025-04-07 ENCOUNTER — NON-APPOINTMENT (OUTPATIENT)
Age: 60
End: 2025-04-07

## 2025-04-21 NOTE — ASU DISCHARGE PLAN (ADULT/PEDIATRIC) - CALL YOUR DOCTOR IF YOU HAVE ANY OF THE FOLLOWING:
Octavia Smith, APRCHRISTIN-[facility]  [address], [address]  [place] [address]    NOTICE FOR THE PATIENT: This clinical note is not designed to be interpreted by patients.  We do not recommend reading it unless you have medical training. These notes may contain candid and (unintentionally) offensive descriptions, which are sometimes required for accurate documentation. If you would like more information about your healthcare, please obtain it directly by myself or my staff/colleagues - never solely from the notes. Thank you for your understanding and cooperation.       Shaq Hobbs Jr. is a 65 y.o. male is seen today for follow up of type 2 diabetes mellitus.    ASSESSMENT AND PLAN:    Interpretation of 72 hour glucose monitor:  At least 72 hours of data were reviewed.  The patient utilizes a Fibrenetix 3 continuous glucose monitoring system.  GMI 8.5%. The average glucose during the reviewed timeframe was 218 with a glucose variability of 35.9% (time in range 34%, time above range 65%, time below range 1%).     Assessment & Plan  Type 2 Diabetes Mellitus.  - Blood glucose levels have shown improvement compared to previous readings on the Dexcom device.  - A1c remains elevated at 8.5.  - Advised to administer a quarter of the usual insulin dose when blood glucose levels are below 150.  - Prescription for Jardiance issued to potentially reduce insulin requirements.    Follow-up: The patient will follow up in 3 months.    Return in about 3 months (around 7/22/2025).       History of Present Illness:  History of Present Illness  The patient is a 65-year-old male who came in for a follow-up on his type 2 diabetes.    He mentioned that his blood sugar levels shot up to 400 recently, even though he didn't change what he was eating. He also had some really low blood sugar readings in the 50s and 60s that made him feel sick. He takes U-500 insulin three times a day but adjusts the dose  Bleeding that does not stop/Swelling that gets worse/Fever greater than (need to indicate Fahrenheit or Celsius)/Wound/Surgical Site with redness, or foul smelling discharge or pus/Nausea and vomiting that does not stop/Unable to urinate

## 2025-04-24 ENCOUNTER — APPOINTMENT (OUTPATIENT)
Dept: PULMONOLOGY | Facility: CLINIC | Age: 60
End: 2025-04-24

## 2025-05-15 ENCOUNTER — APPOINTMENT (OUTPATIENT)
Dept: PULMONOLOGY | Facility: CLINIC | Age: 60
End: 2025-05-15

## (undated) DEVICE — DRAPE MAGNETIC INSTRUMENT MEDIUM

## (undated) DEVICE — WARMING BLANKET UPPER ADULT

## (undated) DEVICE — ELCTR BOVIE PENCIL SMOKE EVACUATION

## (undated) DEVICE — GLV 7.5 PROTEXIS (WHITE)

## (undated) DEVICE — SOL IRR POUR NS 0.9% 500ML

## (undated) DEVICE — DRAIN PENROSE .25" X 18" LATEX

## (undated) DEVICE — WARMING BLANKET LOWER ADULT

## (undated) DEVICE — SOL IRR POUR H2O 250ML

## (undated) DEVICE — DRAIN PENROSE .25" X 12" SILICONE

## (undated) DEVICE — MEDICATION LABELS W MARKER

## (undated) DEVICE — SPONGE END-STRUNG CYLINDRICAL .5X1.5"

## (undated) DEVICE — SUT CHROMIC 3-0 30" C-13

## (undated) DEVICE — DRAPE TOWEL BLUE 17" X 24"

## (undated) DEVICE — DRAPE INSTRUMENT POUCH 6.75" X 11"

## (undated) DEVICE — POSITIONER FOAM EGG CRATE ULNAR 2PCS (PINK)

## (undated) DEVICE — PACK DENTAL

## (undated) DEVICE — PACK MINOR WITH LAP

## (undated) DEVICE — SUT POLYSORB 3-0 30" V-20 UNDYED

## (undated) DEVICE — DRAPE LAPAROTOMY TRANSVERSE

## (undated) DEVICE — NDL HYPO SAFE 22G X 1.5" (BLACK)

## (undated) DEVICE — GOWN LG

## (undated) DEVICE — SUT POLYSORB 2-0 60" REEL

## (undated) DEVICE — DRSG STERISTRIPS 0.5 X 4"

## (undated) DEVICE — ELCTR BOVIE TIP NEEDLE INSULATED 2.8" EDGE

## (undated) DEVICE — FRA-ESU BOVIE FORCE TRIAD T6D04548DX: Type: DURABLE MEDICAL EQUIPMENT

## (undated) DEVICE — GLV 6 PROTEXIS (BLUE)

## (undated) DEVICE — SUT MAXON 2-0 27" T-5

## (undated) DEVICE — VENODYNE/SCD SLEEVE CALF MEDIUM

## (undated) DEVICE — DRAPE TOWEL TIBURON ADHESIVE 19" X 30"

## (undated) DEVICE — SUT MONOCRYL 4-0 27" PS-2 UNDYED